# Patient Record
Sex: FEMALE | URBAN - METROPOLITAN AREA
[De-identification: names, ages, dates, MRNs, and addresses within clinical notes are randomized per-mention and may not be internally consistent; named-entity substitution may affect disease eponyms.]

---

## 2024-10-17 ENCOUNTER — HOSPITAL ENCOUNTER (OUTPATIENT)
Dept: TELEMEDICINE | Facility: HOSPITAL | Age: 64
Discharge: HOME | End: 2024-10-17

## 2024-10-17 DIAGNOSIS — I63.411 EMBOLIC STROKE INVOLVING RIGHT MIDDLE CEREBRAL ARTERY: Primary | ICD-10-CM

## 2024-10-17 PROCEDURE — 99285 EMERGENCY DEPT VISIT HI MDM: CPT | Mod: 95,,, | Performed by: STUDENT IN AN ORGANIZED HEALTH CARE EDUCATION/TRAINING PROGRAM

## 2024-10-17 NOTE — SUBJECTIVE & OBJECTIVE
HPI:  63 y.o. female with a history of HTN, TBI, CAD s/p multiple stents who presents with right eye pain and blurry vision.  Son at bedside provides collateral history.     Her son was notified earlier today that she was complaining of pain in the right eye and blurry vision.  She apparently woke up today at about 6:30-7:00 a.m. and appeared confused, was slurring her speech and couldn't read her mail.  Upon further questioning, she also noted left-sided weakness sometime ' mid afternoon'  yesterday but was unable to provide a specific time    Unclear if she is on any antithrombotics     Images personally reviewed and interpreted:  Study: Head CT and CTA Head & Neck  Study Interpretation: Hyperdense M1 witrh ischemia changes in the right putamen, posterior temporal region and subtle loss of gray-white differentiation in the right frontal lobe.   ASPECTS 7.  CTA showed a R M1 occlusion.   R common carotid is completely occluded from it's origin with occlusion of the right ICA as well.  >70% stenosis  of the left ICA     Assessment and plan:  63 y/o female who presents with a R  MCA syndrome    On exam, she appears to have left-sided weakness, facial droop and dysarthria.  There does appear to be some degree of neglect as well    Not a candidate for IV thrombolytics as she is OOW.    Attempted to obtain a clear timeline of her symptoms.  It seems her symptom onset was >24 hrs which would unfortunately, not make her a candidate for endovascular  intervention either.    Keep permissive HTN w/  BP< 220/110 for now  Start ASA 81mg daily. Avoid DAPT due to the likelihood of a large ischemic burden.     Does not appear to be any significant cerebral edema despite symptom onset being  around 24 hours ago.  She does appear to have some mild volume loss and could compensate.  However,  would still recommend transferring her to a comprehensive stroke center for close monitoring      Lytics recommendation: Thrombolytic therapy  not recommended due to Outside of treatment window     Thrombectomy recommendation:  No. LKN > 24 hrs.  Placement recommendation: transfer to nearest appropriate facility

## 2024-10-17 NOTE — TELEMEDICINE CONSULT
[facility]  Vascular Neurology  Comprehensive Stroke Center  TeleVascular Neurology Acute Consultation Note        Consult Information  Consults    Consulting Provider: LOUISE BOSWELL   Current Providers  No providers found    Patient Location: [facility] TELEMEDICINE ED [facility]OW [facility] Emergency Department    Spoke hospital nurse at bedside with patient assisting consultant.  Patient information was obtained from patient and son .       Stroke Documentation  Acute Stroke Times   Last Known Normal Date: 10/16/24  Unknown Normal Time: Unknown Time  Symptom Onset Date: 10/16/24  Stroke Team Called Date: 10/17/24  Stroke Team Called Time: 1755  Stroke Team Arrival Date: 10/17/24  Stroke Team Arrival Time: 1810  CT Interpretation Time: 1830  Thrombolytic Therapy Recommended: No  CTA Interpretation Time: 1833    NIH Scale:  1a. Level of Consciousness: 0-->Alert, keenly responsive  1b. LOC Questions: 0-->Answers both questions correctly  1c. LOC Commands: 0-->Performs both tasks correctly  2. Best Gaze: 0-->Normal  3. Visual: 0-->No visual loss  4. Facial Palsy: 2-->Partial paralysis (total or near-total paralysis of lower face)  5a. Motor Arm, Left: 3-->No effort against gravity, limb falls  5b. Motor Arm, Right: 0-->No drift, limb holds 90 (or 45) degrees for full 10 secs  6a. Motor Leg, Left: 2-->Some effort against gravity, leg falls to bed by 5 secs, but has some effort against gravity  6b. Motor Leg, Right: 0-->No drift, leg holds 30 degree position for full 5 secs  7. Limb Ataxia: 0-->Absent  8. Sensory: 1-->Mild-to-moderate sensory loss, patient feels pinprick is less sharp or is dull on the affected side, or there is a loss of superficial pain with pinprick, but patient is aware of being touched  9. Best Language: 0-->No aphasia, normal  10. Dysarthria: 1-->Mild-to-moderate dysarthria, patient slurs at least some words and, at worst, can be understood with some  difficulty  11. Extinction and Inattention (formerly Neglect): 1-->Visual, tactile, auditory, spatial, or personal inattention or extinction to bilateral simultaneous stimulation in one of the sensory modalities  Total (NIH Stroke Scale): 10      Modified Janes: Score: 1  Shayan Coma Scale:     ABCD2 Score:    [id number] Score:    HAS -BLED Score:    ICH Score:    Hunt & Kendrick Classification:      There were no vitals taken for this visit.      In my opinion, this was a: Tier 2; VAN Stroke Assessment: Positive     Medical Decision Making  HPI:  63 y.o. female with a history of HTN, TBI, CAD s/p multiple stents who presents with right eye pain and blurry vision.  Son at bedside provides collateral history.     Her son was notified earlier today that she was complaining of pain in the right eye and blurry vision.  She apparently woke up today at about 6:30-7:00 a.m. and appeared confused, was slurring her speech and couldn't read her mail.  Upon further questioning, she also noted left-sided weakness sometime ' mid afternoon'  yesterday but was unable to provide a specific time    Unclear if she is on any antithrombotics     Images personally reviewed and interpreted:  Study: Head CT and CTA Head & Neck  Study Interpretation: Hyperdense M1 witrh ischemia changes in the right putamen, posterior temporal region and subtle loss of gray-white differentiation in the right frontal lobe.   ASPECTS 7.  CTA showed a R M1 occlusion.   R common carotid is completely occluded from it's origin with occlusion of the right ICA as well.  >70% stenosis  of the left ICA     Assessment and plan:  65 y/o female who presents with a R  MCA syndrome    On exam, she appears to have left-sided weakness, facial droop and dysarthria.  There does appear to be some degree of neglect as well    Not a candidate for IV thrombolytics as she is OOW.    Attempted to obtain a clear timeline of her symptoms.  It seems her symptom onset was >24 hrs  which would unfortunately, not make her a candidate for endovascular  intervention either.    Keep permissive HTN w/  BP< 220/110 for now  Start ASA 81mg daily. Avoid DAPT due to the likelihood of a large ischemic burden.     Does not appear to be any significant cerebral edema despite symptom onset being  around 24 hours ago.  She does appear to have some mild volume loss and could compensate.  However,  would still recommend transferring her to a comprehensive stroke center for close monitoring      Lytics recommendation: Thrombolytic therapy not recommended due to Outside of treatment window     Thrombectomy recommendation:  No. LKN > 24 hrs.  Placement recommendation: transfer to nearest appropriate facility                ROS  Physical Exam  No past medical history on file.  No past surgical history on file.  No family history on file.    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  Immediate/timely evaluation by a highly prepared expert is paramount for optimal outcomes  High risk for neurological deterioration if not properly diagnosed  High risk for neurological deterioration if not treated promplty/as soon as possible  Complex diagnostic evaluation may be required (advanced imaging)  High risk treatment options (thrombolytics and/or thrombectomy)    Patient care was coordinated with spoke provider, including but not limted to    Discussing likely diagnosis/etiology of symptoms  Making recommendations for further diagnostic studies  Making recommendations for intravenous thrombolytics or other advanced therapies  Making recommendations for disposition (admission/transfer for higher level  of care)